# Patient Record
(demographics unavailable — no encounter records)

---

## 2024-11-06 NOTE — INTERPRETER SERVICES
[Time Spent: ____ minutes] : Total time spent using  services: [unfilled] minutes. The patient's primary language is not English thus required  services. [Interpreters_IDNumber] : [id number] [Interpreters_FullName] : Alen [TWNoteComboBox1] : Somali

## 2024-11-06 NOTE — HISTORY OF PRESENT ILLNESS
[FreeTextEntry1] : Establish Care. [de-identified] : Patient is a 21-yo female who is here to establish care.  Medical History- no pmhx or surgical hx Family history- grandmother with diabetes Social History- smokes marijuana regularly, smokes cigarettes a few times per month, drinks alcohol occasionally. Normal well-balanced diet, does not exercise currently, lives in an apartment 2nd floor by herself Menstrual and reproductive history- went through an  last August (2nd ), had AUB for 3-4 weeks but has normalized, last menstrual period , is sexually active, says receives depot injection recently, no history of STDs. Vaccination history- Does not remember receiving any vaccinations.   Today, patient endorses pain in finger from recent fracture, mild nonspecific abd pain as well as some cough and congestion suspected to be from recent viral illness. Vitally stable. No other acute issues.

## 2024-11-06 NOTE — PHYSICAL EXAM
[No Acute Distress] : no acute distress [Well Nourished] : well nourished [Normal Sclera/Conjunctiva] : normal sclera/conjunctiva [PERRL] : pupils equal round and reactive to light [Normal Outer Ear/Nose] : the outer ears and nose were normal in appearance [No JVD] : no jugular venous distention [No Respiratory Distress] : no respiratory distress  [No Accessory Muscle Use] : no accessory muscle use [Normal Rate] : normal rate  [Regular Rhythm] : with a regular rhythm [No Carotid Bruits] : no carotid bruits [No Varicosities] : no varicosities [No Edema] : there was no peripheral edema [Soft] : abdomen soft [No HSM] : no HSM [Normal Anterior Cervical Nodes] : no anterior cervical lymphadenopathy [No CVA Tenderness] : no CVA  tenderness [No Joint Swelling] : no joint swelling [No Rash] : no rash [Coordination Grossly Intact] : coordination grossly intact [No Focal Deficits] : no focal deficits [Normal Insight/Judgement] : insight and judgment were intact [de-identified] : scant pharyngeal erythema [de-identified] : left cervical lad, mild [de-identified] : coarse bs on left, subtle, otherwise comfortable on RA [de-identified] : obesity , rlq tender upon deep palpation, without rebound or guarding, +BS [de-identified] : left 5th digit with suspected malunion, no ttp

## 2024-11-06 NOTE — ASSESSMENT
[FreeTextEntry1] : Patient is a 21-yo female here to establish care.  #recent URI- resolving - cough and congestion, improving in past week - monitor for worsening sxs: fever, chillls, night sweats  #Cervical LAD on the left- likely reactive no b symptoms or dysphagia reported - counselled to monitor for pain or enlargement - likely reactive from recent infection  #Left 5th digit fracture, possible malunion - no pain or ttp, normal rom - monitor for now - if still bothering pt>> xray at next appt  #Abd pain - nonspecific, worse in RLQ with some suprapubic ttp - f/u abd US, UA f/u gyn for well woman exam and papsmear  Obesity; Diet/Exercise Counseled Patient was counseled on changing their diet to low fat, low carbohydrates and low cholesterol. Patient was also counseled on increasing their activity to 45 minutes of exercise daily.  #HCM - Labs: cbc, cmp, mg, UA, TSH, A1c, lipid profile, HIV, hep b AB and core IGM, hep c with reflex, mmr with varicella, quant, GC amplification - Referrals: OBGYN for wellness examination - vaccinations: flu at this visit Vaccine; All vaccine side effects discussed with pt prior to injection. Tolerated well by patient. Patient instructed to return to office if any side effects; including, but not limited to, rash, fever or vomiting occur. -screenings: f/u with OBGYN for pap smear, HPV

## 2024-11-20 NOTE — INTERPRETER SERVICES
[Time Spent: ____ minutes] : Total time spent using  services: [unfilled] minutes. The patient's primary language is not English thus required  services. [Interpreters_IDNumber] : [id number] [Interpreters_FullName] : Alen [TWNoteComboBox1] : Slovak

## 2024-11-20 NOTE — END OF VISIT
[] : Resident [FreeTextEntry3] : I saw the patient, examined the patient independently, reviewed medical record, and provided the medical services. Agree with resident note as personally edited above. discussed + hiv screen and ab unclear how but already on biktarvy, suspect douglas, unable to discern through papers and hie recommend vtc eval needs coinfection testing as well as viral loads, etc recent pharyngitis improving, doubt pneumonia but reasonable to check cxr unclear what b/l arm rash is, could be bites ?bed bugs, though unclear and not stereotypical lesions. doubt scabies by location/distribution patient denies ivda however subtle bruising suggestive of recent venipuncture appreciated in b/l antecubital fossa and L hand at vein

## 2024-11-20 NOTE — HISTORY OF PRESENT ILLNESS
[de-identified] : Patient is a 21-yo female here for follow up for critical lab value. Established care last visit. At that time, patient endorses pain in finger from recent fracture, mild nonspecific abd pain as well as some cough, congestion, and cervical lymphadenopathy suspected to be from recent viral illness. Lab work was sent and HIV came back positive. Today, patient says cough is improved, but productive of thick yellow sputum. Has had sore throat for the past three weeks. Endorses fevers. Endorses intermittent abd pain. Endorses skin lesions on both arms. Denies urinary symptoms. Vitally stable. No other acute issues.     [FreeTextEntry1] : f/u of critical lab value

## 2024-11-20 NOTE — PHYSICAL EXAM
[No Acute Distress] : no acute distress [Well Nourished] : well nourished [Normal Sclera/Conjunctiva] : normal sclera/conjunctiva [PERRL] : pupils equal round and reactive to light [Normal Outer Ear/Nose] : the outer ears and nose were normal in appearance [No JVD] : no jugular venous distention [No Respiratory Distress] : no respiratory distress  [No Accessory Muscle Use] : no accessory muscle use [Normal Rate] : normal rate  [Regular Rhythm] : with a regular rhythm [No Carotid Bruits] : no carotid bruits [No Varicosities] : no varicosities [No Edema] : there was no peripheral edema [Soft] : abdomen soft [No HSM] : no HSM [Normal Anterior Cervical Nodes] : no anterior cervical lymphadenopathy [No Joint Swelling] : no joint swelling [No Rash] : no rash [Coordination Grossly Intact] : coordination grossly intact [No Focal Deficits] : no focal deficits [Normal Insight/Judgement] : insight and judgment were intact [Normal] : no respiratory distress, lungs were clear to auscultation bilaterally and no accessory muscle use [de-identified] : no pharyngeal exudate [de-identified] : left cervical lad, mild [de-identified] : obesity , rlq tender upon deep palpation, without rebound or guarding, +BS [de-identified] : left 5th digit with suspected malunion, no ttp [de-identified] : skin lesions on bilateral UEs

## 2024-11-20 NOTE — ASSESSMENT
[FreeTextEntry1] : Patient is a 21-yo female here for follow up for critical lab value. Established care last visit. At that time, patient endorses pain in finger from recent fracture, mild nonspecific abd pain as well as some cough and congestion suspected to be from recent viral illness. Labwork was sent and HIV positive. Currently endorsing sore throat, fever, productive cough, skin lesions. Vitally stable.   #HIV- NEW DIAGNOSIS - HIV 1/2 AB and HIV AG/AB test positive - Had URI symptoms and cervical LAD last visit 2 weeks ago - UA LE mild and bacteria growth, asymptomatic - [facility] - was already given Biktarvy prescribed likely from [facility], not seen on HIE - viral load sent   #recent URI #Cervical LAD on the left- likely reactive - cough and congestion, improved - continued sore throat - mild cervical LAD on exam today - no b symptoms or dysphagia reported check cxr r/o walking pna no dyspnea or focal rhonchi posterior pharynx improved sputum culture if able to produce  #Skin lesions - Has skin lesions on bilateral arms, non purulent - patient instructed to change sheets unclear nidus, not in typical location for scabies, not typical in morphology for bed bugs asked patient to observe for bloody streaks on sheets, as she reports her partner may have few as well - derm referral recommend id also takes a look on vtc visit not currently using any creams   #Left 5th digit fracture, possible malunion - no pain or ttp, normal rom - monitor for now - if still bothering pt>> xray at next appt - resent left hand X-ray  #Abd pain - nonspecific, worse in RLQ with some suprapubic ttp - Abd US complete, normal 11/15/2024 f/u gyn for well woman exam and papsmear - had pap smear done within the past week, f/u - gyn referral was sent, informed patient to schedule  Obesity; Diet/Exercise Counseled Patient was counseled on changing their diet to low fat, low carbohydrates and low cholesterol. Patient was also counseled on increasing their activity to 45 minutes of exercise daily.  #HCM - Labs sent last visit: cbc, cmp, mg, UA, TSH, A1c, lipid profile, HIV, hep b AB and core IGM, hep c with reflex, mmr with varicella, quant, GC amplification - Labs sent this visit: viral load - [facility] - f/u XRay L hand - f/u CXR - flu vaccine given last visit - f/u pap smear and Gynecology referral - RTC

## 2024-12-04 NOTE — ASSESSMENT
[FreeTextEntry1] : 21F with new diagnosis of HIV with a history of HPV. I reviewed the International Anal Neoplasia Society guidelines with her and given her history of HPV and HIV, she opted for an anal pap smear. Will follow up the results with her and if concerning, would undergo high resolution anoscopy.

## 2024-12-04 NOTE — HISTORY OF PRESENT ILLNESS
[FreeTextEntry1] : 21F with a recent diagnosis of HIV (about 5 days ago) and history of HPV presents for anal pap smear. She does describe intermittently experiencing a painful lump in her perianal region that spontaneously resolved. Otherwise she is asymptomatic. No known family history of colon, rectal or anal cancer. Never had a colonoscopy.

## 2024-12-04 NOTE — REASON FOR VISIT
[Initial Evaluation] : an initial evaluation [[employer] ] : provided by [employer]   [Time Spent: ____ minutes] : Total time spent using  services: [unfilled] minutes. The patient's primary language is not English thus required  services. [Interpreters_IDNumber] : [id number] [Interpreters_FullName] : Ty [TWNoteComboBox1] : Burkinan

## 2024-12-04 NOTE — PHYSICAL EXAM
[FreeTextEntry1] : Exam chaperoned by medical assistant. Examined in prone jose knife position. Anal pap smear performed. No external abnormalities, no condyloma, skin tags or excoriations. On digital exam, no appreciable masses or lesions. On anoscopy, no internal abnormalities.   Asked to point to where she felt the lump in the past and she points to her vulva.

## 2024-12-19 NOTE — ASSESSMENT
[FreeTextEntry1] : #HIV Disease (New Diagnosis 11/2024) -On Biktarvy -, T cells 500s (11/2024) -Repeat labs today.  -Will recheck if she needs to change the regimen due to intolerance.   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 -Saw Ob/GYN and colorectal.  -Cervical PAP smear negative.   #Dyslipidemia -For now encourage healthy diet and exercise.  #HCM -Measles Non-immune, Mumps, rubella and varicella Immune. Will need MMR vaccine. -Hep B immune, Hep C negative. Hep A Ab Positive.  -Flu shot and HPV Vaccine first shot (12/19/2024)

## 2024-12-19 NOTE — HISTORY OF PRESENT ILLNESS
[FreeTextEntry1] : Doing okay. reports having intermittent headaches, drowsiness and heart racing when she takes biktarvy. Denies any other active issues.  Now  from her partner.

## 2024-12-19 NOTE — PHYSICAL EXAM
[General Appearance - In No Acute Distress] : in no acute distress [Oropharynx] : the oropharynx was normal with no thrush [Respiration, Rhythm And Depth] : normal respiratory rhythm and effort [Heart Sounds] : normal S1 and S2 [Abdomen Soft] : soft [Abdomen Tenderness] : non-tender [Musculoskeletal - Swelling] : no joint swelling [] : no rash [No Focal Deficits] : no focal deficits

## 2025-01-09 NOTE — HISTORY OF PRESENT ILLNESS
[FreeTextEntry1] : [place] interpetor used. Doing okay. Taking the Biktarvy. In [place], visiting family.

## 2025-01-09 NOTE — ASSESSMENT
[FreeTextEntry1] : #HIV Disease (New Diagnosis 11/2024) -On Biktarvy -VL undetected, T cells 574 (12/2024) -Recheck labs in next visit.  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 -Saw Ob/GYN and colorectal. -Cervical PAP smear negative.  #Dyslipidemia -For now encourage healthy diet and exercise.  #HCM -Measles Non-immune, Mumps, rubella and varicella Immune. Will need MMR vaccine. -Hep B immune, Hep C negative. Hep A Ab Positive. -Flu shot and HPV Vaccine first shot (12/19/2024).

## 2025-02-06 NOTE — HISTORY OF PRESENT ILLNESS
[FreeTextEntry1] : Denies any acute complaints. Reports good complaince. Mentions that she is going to [place] on coming Tuesday. Will likely return with in 3 months. Will have to check with insurance if we can override the limit for refills.

## 2025-02-06 NOTE — ASSESSMENT
[FreeTextEntry1] : #HIV Disease (New Diagnosis 11/2024) -On Biktarvy -VL undetected, T cells 574 (12/2024) -Recheck labs today.  -Appreciate pharmacy assistance. Will attempt to get an override for the Biktarvy refill.   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 -Saw Ob/GYN and colorectal. -Cervical PAP smear negative. -Follow up with birth control medications.   #Screen for STD -Urine G/C -Syphilis testing ordered.   #Dyslipidemia -For now encourage healthy diet and exercise.  #HCM -Measles Non-immune, Mumps, rubella and varicella Immune. Gave MMR booster today.  -Hep B immune, Hep C negative. Hep A Ab Positive. -Flu shot uptodate and HPV VaccinesX2. Third vaccine at 6 months.

## 2025-02-06 NOTE — PHYSICAL EXAM
[General Appearance - In No Acute Distress] : in no acute distress [Oropharynx] : the oropharynx was normal with no thrush [Respiration, Rhythm And Depth] : normal respiratory rhythm and effort [Heart Sounds] : normal S1 and S2 [Abdomen Soft] : soft [Abdomen Tenderness] : non-tender [Musculoskeletal - Swelling] : no joint swelling [] : no rash [Oriented To Time, Place, And Person] : oriented to person, place, and time [FreeTextEntry1] : [place]er Speaking.

## 2025-04-10 NOTE — ASSESSMENT
[FreeTextEntry1] : #HIV Disease (New Diagnosis 11/2024) -On Biktarvy -VL undetected, T cells 574 (12/2024) -Recheck labs today.  #URI/Sinus congestion/Headaches -Follow up with [facility] -Suggested supportive care with anti-histamine/benadryl  #Left acute distal fibular non-displaced fracture -Will refer to orthopedics for evaluation. May need a brace.  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 -Saw Ob/GYN and colorectal. -Cervical PAP smear negative. -On injectable progestin for contraceptive.  #Dyslipidemia -For now encourage healthy diet and exercise.  #HCM -Measles Non-immune, Mumps, rubella and varicella Immune. S/p MMR 2/2025. Recheck Titers totday. -Hep B immune, Hep C negative. Hep A Ab Positive. -Flu shot uptodate and HPV VaccinesX2. 3rd dose in May-June 2025.

## 2025-04-10 NOTE — PHYSICAL EXAM
[General Appearance - In No Acute Distress] : in no acute distress [Oropharynx] : the oropharynx was normal with no thrush [Respiration, Rhythm And Depth] : normal respiratory rhythm and effort [Heart Sounds] : normal S1 and S2 [Abdomen Soft] : soft [Abdomen Tenderness] : non-tender [] : no rash [No Focal Deficits] : no focal deficits [Oriented To Time, Place, And Person] : oriented to person, place, and time [FreeTextEntry1] : Swedish Speaking.

## 2025-04-10 NOTE — HISTORY OF PRESENT ILLNESS
[FreeTextEntry1] : Returns from [place] since Feb. Reports complaint with her medications. Had friends gather last week. Drank alcohol. Has been feeling sinus congestion and some headaches. Feverish.  Also was noted to have a fall and twisted her left foot. Describes pain. But able to bear weight on it.

## 2025-05-21 NOTE — PHYSICAL EXAM
[de-identified] : Left ankle foot examination today demonstrates no tenderness.  This includes the distal fibula.  Her various tendons around the ankle including Achilles, peroneal, posterior tibial and anterior tendons are clinically intact.  A hallux valgus deformity is noted. [de-identified] : Previous radiograph report of the left ankle and foot from April 10, 2025 describes a an acute distal fibular nondisplaced fracture, ankle joint mortise is symmetrical, mild talonavicular joint and hallux MTP joint osteoarthrosis.  Mild hallux valgus with metatarsal phalange joint angle measuring 17 degrees.  Weightbearing left ankle radiographs (AP/lateral/oblique) were obtained today Indication-fracture These radiographs demonstrate a Ward A ankle fracture with no displacement.  Significant signs of healing are noted.  The ankle mortise is well reduced.  The radiographs were interpreted and reviewed with the patient.  Weightbearing left foot radiographs (AP/lateral/oblique) were obtained today Indication-rule out fracture These radiographs demonstrate no evidence of a fracture or dislocation. The radiographs were interpreted and reviewed with the patient.

## 2025-05-21 NOTE — HISTORY OF PRESENT ILLNESS
[FreeTextEntry1] : The patient is a 22-year-old female that presents chief complaint of left ankle fracture.  This occurred around April 10.  She is not wearing a boot or brace.  She reports no pain.  She is back at work as a .  She also goes to school.  She is here basically for checkup.

## 2025-05-21 NOTE — ASSESSMENT
[FreeTextEntry1] : The patient has a stable ankle fracture (Ward A).  She currently is not wearing any boot or brace and has no pain.  She is working as a  and going to school.  Her examination shows no tenderness.  She may increase activities as tolerated.  She is basically healed radiographically clinically.  She will follow-up me on an as-needed basis.  All questions were answered.  This visit was facilitated by a  named Daly #[id number]

## 2025-06-03 NOTE — HISTORY OF PRESENT ILLNESS
[FreeTextEntry1] : Patient presents for evaluation of a left small finger injury that occurred many years ago. She reports she banged the finger into something and then it was bent at the tip of the finger. This was never treated. In November 2024 she had x-rays which showed a deformity of the finger. She was able to make a full fist and use the finger normally but when she extends the finger, there is an odd bend the finger. This does not overall interfere with her daily life.

## 2025-06-03 NOTE — PHYSICAL EXAM
[de-identified] :  General: No acute distress Respiratory: Nonlabored Cardiovascular: Warm and well-perfused   left small finger  Line [place] neck deformity with chronic mallet finger  Skin: Intact, swelling and ecchymosis around the DIP joint Motor: AIN PIN M R U motor intact Sensation: No numbness or tingling Can make a full fist, FDP and FDS intact [de-identified] :  3 views of the left small finger were obtained  in [employer] system 11/24 and reviewed in clinic showing no fractures or dislocations, swna neck deformity of finger

## 2025-06-03 NOTE — ASSESSMENT
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 She can make a strong fist, which is the whole point of the small finger so I do not feel these surgeries, would ultimately benefit her and can only make her worse. Patient understands and wishes to proceed with not operative management. She can return as needed.